# Patient Record
Sex: FEMALE | Race: WHITE | Employment: FULL TIME | URBAN - METROPOLITAN AREA
[De-identification: names, ages, dates, MRNs, and addresses within clinical notes are randomized per-mention and may not be internally consistent; named-entity substitution may affect disease eponyms.]

---

## 2020-02-06 ENCOUNTER — TELEPHONE (OUTPATIENT)
Dept: OTHER | Facility: OTHER | Age: 35
End: 2020-02-06

## 2020-02-06 NOTE — TELEPHONE ENCOUNTER
02/06/2020  4:10 PM    Called and LM for patient who was No Show for 4:00 pm Virtual Visit.    Paulette Minaya, MPH, PA-C  [facility]  [phone]

## 2020-02-20 ENCOUNTER — CLINICAL SUPPORT (OUTPATIENT)
Dept: OTHER | Facility: OTHER | Age: 35
End: 2020-02-20

## 2020-02-20 VITALS
BODY MASS INDEX: 32.95 KG/M2 | WEIGHT: 205 LBS | DIASTOLIC BLOOD PRESSURE: 80 MMHG | HEIGHT: 66 IN | SYSTOLIC BLOOD PRESSURE: 128 MMHG

## 2020-02-20 DIAGNOSIS — I10 HYPERTENSION, ESSENTIAL: Chronic | ICD-10-CM

## 2020-02-20 RX ORDER — FLUCONAZOLE 150 MG/1
1 TABLET ORAL DAILY PRN
COMMUNITY
Start: 2020-01-28 | End: 2022-04-11 | Stop reason: SDUPTHER

## 2020-02-20 RX ORDER — BUPROPION HYDROCHLORIDE 150 MG/1
300 TABLET ORAL DAILY
COMMUNITY
Start: 2020-01-12 | End: 2022-04-11 | Stop reason: SDUPTHER

## 2020-02-20 RX ORDER — LISINOPRIL AND HYDROCHLOROTHIAZIDE 10; 12.5 MG/1; MG/1
1 TABLET ORAL DAILY
COMMUNITY
Start: 2020-01-21 | End: 2022-03-03

## 2020-02-20 RX ORDER — INSULIN ASPART 100 [IU]/ML
INJECTION, SOLUTION INTRAVENOUS; SUBCUTANEOUS
COMMUNITY
Start: 2019-12-23 | End: 2022-03-30 | Stop reason: SDUPTHER

## 2020-02-20 RX ORDER — METFORMIN HYDROCHLORIDE 500 MG/1
1000 TABLET ORAL 2 TIMES DAILY
COMMUNITY
Start: 2020-01-29

## 2020-02-20 NOTE — PROGRESS NOTES
Digital Medicine: Video Consult    33 yo female presents for enrollment in [employer].  Pt is interested in the Diabetes Program too but has OLEGARIO and is on an insulin pump so does no qualify for this Program.  She was diagnosed with HTN 12 years ago and states that her readings fluctuate depending on her sodium intake and her weight.  States that her weight is steadily increasing.  Currently exercising only once weekly for 30-60 minutes but hopes to start doing more soon.  States affording her insulin is sometimes difficult.  No current PCP though she has an Endocrinologist that she sees regularly.     Hypertension Consult   This is a chronic problem. The current episode started more than 1 year ago. The problem has been waxing and waning since onset. The problem is uncontrolled. Pertinent negatives include no blurred vision, chest pain or headaches. There are no associated agents to hypertension. Risk factors for coronary artery disease include dyslipidemia, obesity and diabetes mellitus. Past treatments include ACE inhibitors and diuretics. The current treatment provides moderate improvement. There are no compliance problems.  There is no history of kidney disease or CAD/MI.       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), managed as type 1    Overweight    Pure hypercholesterolemia    Essential hypertension       Past Medical History:   Diagnosis Date    Depression     Diabetes mellitus type I     OLEGARIO    Diabetes mellitus, type 2     OLEGARIO    Hyperlipidemia     Hypertension     Obesity        Family History   Problem Relation Age of Onset    Anemia Mother     Diabetes Mother     Diabetes Father        Social History     Socioeconomic History    Marital status: Single     Spouse name: Not on file    Number of children: Not on file    Years of education: Not on file     Highest education level: Not on file   Occupational History    Not on file   Social Needs    Financial resource strain: Somewhat hard    Food insecurity:     Worry: Not on file     Inability: Not on file    Transportation needs:     Medical: Not on file     Non-medical: Not on file   Tobacco Use    Smoking status: Former Smoker     Types: Cigarettes    Smokeless tobacco: Never Used   Substance and Sexual Activity    Alcohol use: Yes     Frequency: 2-3 times a week     Binge frequency: Less than monthly    Drug use: Never    Sexual activity: Not on file   Lifestyle    Physical activity:     Days per week: 1 day     Minutes per session: 40 min    Stress: Not on file   Relationships    Social connections:     Talks on phone: Not on file     Gets together: Not on file     Attends [employer] service: Not on file     Active member of club or organization: Not on file     Attends meetings of clubs or organizations: Not on file     Relationship status: Not on file   Other Topics Concern    Not on file   Social History Narrative    Not on file       Review of patient's allergies indicates:   Allergen Reactions    Penicillins          Current Outpatient Medications:     buPROPion (WELLBUTRIN XL) 150 MG TB24 tablet, Take 300 mg by mouth once daily., Disp: , Rfl:     fluconazole (DIFLUCAN) 150 MG Tab, Take 1 tablet by mouth daily as needed., Disp: , Rfl:     lisinopril-hydrochlorothiazide (PRINZIDE,ZESTORETIC) 10-12.5 mg per tablet, Take 1 tablet by mouth once daily., Disp: , Rfl:     metFORMIN (GLUCOPHAGE) 500 MG tablet, Take 1,000 mg by mouth 2 (two) times daily., Disp: , Rfl:     NOVOLOG U-100 INSULIN ASPART 100 unit/mL injection, 80 UNITS ONCE DAILY TOTAL DAILY DOSE PER INSULIN PUMP, Disp: , Rfl:         Patient is eligible for enrollment.  Enrollment completed.            Assessment & Plan      Essential Hypertension I10  Patient to be enrolled in the Hypertension Digital Medicine Program.   - An [facility]  digital blood pressure cuff will be provided for at-home use.  Patient will be trained to use the device and start recording the blood pressure readings.   - With each blood pressure measurement, the patient's data automatically integrates with [facility] electronic medical record system.    - The patient's Care Team monitors the results and intervenes with medication or lifestyle modifications as needed.   - The patient and his/her physician (if applicable) receive customized monthly reports to track progress.

## 2020-03-10 ENCOUNTER — PATIENT MESSAGE (OUTPATIENT)
Dept: ADMINISTRATIVE | Facility: OTHER | Age: 35
End: 2020-03-10

## 2020-05-21 ENCOUNTER — PATIENT MESSAGE (OUTPATIENT)
Dept: ADMINISTRATIVE | Facility: OTHER | Age: 35
End: 2020-05-21

## 2022-03-03 ENCOUNTER — OFFICE VISIT (OUTPATIENT)
Dept: FAMILY MEDICINE | Facility: CLINIC | Age: 37
End: 2022-03-03
Payer: COMMERCIAL

## 2022-03-03 DIAGNOSIS — I10 HYPERTENSION, ESSENTIAL: Primary | ICD-10-CM

## 2022-03-03 PROCEDURE — 99214 OFFICE O/P EST MOD 30 MIN: CPT | Mod: 95,,, | Performed by: PHYSICIAN ASSISTANT

## 2022-03-03 PROCEDURE — 99214 PR OFFICE/OUTPT VISIT, EST, LEVL IV, 30-39 MIN: ICD-10-PCS | Mod: 95,,, | Performed by: PHYSICIAN ASSISTANT

## 2022-03-03 RX ORDER — AMLODIPINE BESYLATE 5 MG/1
5 TABLET ORAL DAILY
Qty: 30 TABLET | Refills: 0 | Status: SHIPPED | OUTPATIENT
Start: 2022-03-03 | End: 2022-04-01

## 2022-03-03 RX ORDER — LISINOPRIL AND HYDROCHLOROTHIAZIDE 20; 25 MG/1; MG/1
1 TABLET ORAL DAILY
COMMUNITY

## 2022-03-03 RX ORDER — METOPROLOL SUCCINATE 25 MG/1
25 TABLET, EXTENDED RELEASE ORAL DAILY
COMMUNITY

## 2022-03-03 NOTE — PATIENT INSTRUCTIONS
A few reminders from today:    Continue medications.   Start amlodipine. Take in the evening.   Continue to monitor BP  F/U 2 weeks    Follow up with me if needed.   Please go to ER/urgent care if after hours or symptoms persist/worsen.     Do not hesitate to get in touch with me should you have any further questions.     Thank you for trusting me with your care!  I wish you health and happiness.    -Dara Enriquez PA-C

## 2022-03-03 NOTE — PROGRESS NOTES
Subjective:       Patient ID: Susan Delaney is a 36 y.o. female.    The patient location is: [place], [place]  The chief complaint leading to consultation is: HTN    Visit type: audiovisual    Face to Face time with patient: 20  minutes of total time spent on the encounter, which includes face to face time and non-face to face time preparing to see the patient (eg, review of tests), Obtaining and/or reviewing separately obtained history, Documenting clinical information in the electronic or other health record, Independently interpreting results (not separately reported) and communicating results to the patient/family/caregiver, or Care coordination (not separately reported).     Each patient to whom he or she provides medical services by telemedicine is:  (1) informed of the relationship between the physician and patient and the respective role of any other health care provider with respect to management of the patient; and (2) notified that he or she may decline to receive medical services by telemedicine and may withdraw from such care at any time.      Chief Complaint: HTN    HPI     Pt is new to me and this clinic.     Pt is a 36 year old female with depression, HTN, HLD, T1DM. She presents today complaining of elevated blood pressure. Pt states she is compliant with all of her meds and follows a low sodium diet. Most recent readings: 142/101, 152/107. She does admit to headache with dull pressure. Denies vision changes, confusion, focal weakness.     Review of Systems   Constitutional: Negative for activity change, appetite change, chills, fatigue, fever and unexpected weight change.   HENT: Negative for nasal congestion, ear pain, hearing loss, rhinorrhea and sore throat.    Eyes: Negative for visual disturbance.   Respiratory: Negative for cough, chest tightness, shortness of breath and wheezing.    Cardiovascular: Negative for chest pain and palpitations.   Gastrointestinal: Negative for abdominal pain,  change in bowel habit, constipation, diarrhea, nausea, vomiting, reflux and change in bowel habit.   Genitourinary: Negative for difficulty urinating, dysuria, frequency, hematuria, menstrual irregularity, menstrual problem and urgency.   Musculoskeletal: Positive for neck pain. Negative for arthralgias and myalgias.   Integumentary:  Negative for rash.   Neurological: Positive for headaches. Negative for dizziness, vertigo, seizures, syncope, weakness and light-headedness.   Psychiatric/Behavioral: Negative for dysphoric mood and sleep disturbance. The patient is not nervous/anxious.          Objective:      Physical Exam  Vitals and nursing note reviewed.   Constitutional:       General: She is not in acute distress.     Appearance: Normal appearance. She is not ill-appearing, toxic-appearing or diaphoretic.   HENT:      Head: Normocephalic and atraumatic.      Right Ear: Tympanic membrane, ear canal and external ear normal. There is no impacted cerumen.      Left Ear: Tympanic membrane, ear canal and external ear normal. There is no impacted cerumen.   Eyes:      General: No scleral icterus.        Right eye: No discharge.         Left eye: No discharge.      Extraocular Movements: Extraocular movements intact.      Conjunctiva/sclera: Conjunctivae normal.      Pupils: Pupils are equal, round, and reactive to light.   Cardiovascular:      Rate and Rhythm: Normal rate and regular rhythm.      Pulses: Normal pulses.      Heart sounds: Normal heart sounds. No murmur heard.    No friction rub. No gallop.   Pulmonary:      Effort: Pulmonary effort is normal. No respiratory distress.      Breath sounds: Normal breath sounds. No stridor. No wheezing, rhonchi or rales.   Abdominal:      General: Abdomen is flat. Bowel sounds are normal. There is no distension.      Palpations: Abdomen is soft. There is no mass.      Tenderness: There is no abdominal tenderness. There is no right CVA tenderness, left CVA tenderness,  guarding or rebound.   Musculoskeletal:         General: No deformity or signs of injury.      Cervical back: Normal range of motion and neck supple.      Right lower leg: No edema.      Left lower leg: No edema.   Lymphadenopathy:      Cervical: No cervical adenopathy.   Skin:     General: Skin is warm.      Capillary Refill: Capillary refill takes less than 2 seconds.      Coloration: Skin is not jaundiced or pale.      Findings: No lesion or rash.   Neurological:      General: No focal deficit present.      Mental Status: She is alert and oriented to person, place, and time. Mental status is at baseline.   Psychiatric:         Mood and Affect: Mood normal.         Behavior: Behavior normal.         Thought Content: Thought content normal.         Judgment: Judgment normal.       Full exam was not able to be performed today, as this was a virtual visit.     Assessment:       Problem List Items Addressed This Visit        Cardiac/Vascular    Hypertension, essential - Primary (Chronic)    Relevant Medications    amLODIPine (NORVASC) 5 MG tablet          Plan:           1. Hypertension, essential  - amLODIPine (NORVASC) 5 MG tablet; Take 1 tablet (5 mg total) by mouth once daily.  Dispense: 30 tablet; Refill: 0  -medication resistant HTN--recommend full workup for secondary causes    RTC/ER precautions given. F/U 2 weeks

## 2022-03-09 ENCOUNTER — TELEPHONE (OUTPATIENT)
Dept: FAMILY MEDICINE | Facility: CLINIC | Age: 37
End: 2022-03-09
Payer: COMMERCIAL

## 2022-03-09 NOTE — TELEPHONE ENCOUNTER
----- Message from Dara Enriquez PA-C sent at 3/3/2022  1:11 PM CST -----  Call in 1 week about BP

## 2022-03-23 ENCOUNTER — OFFICE VISIT (OUTPATIENT)
Dept: OBSTETRICS AND GYNECOLOGY | Facility: CLINIC | Age: 37
End: 2022-03-23
Payer: COMMERCIAL

## 2022-03-23 ENCOUNTER — LAB VISIT (OUTPATIENT)
Dept: LAB | Facility: HOSPITAL | Age: 37
End: 2022-03-23
Attending: STUDENT IN AN ORGANIZED HEALTH CARE EDUCATION/TRAINING PROGRAM
Payer: COMMERCIAL

## 2022-03-23 VITALS
DIASTOLIC BLOOD PRESSURE: 80 MMHG | RESPIRATION RATE: 16 BRPM | BODY MASS INDEX: 32.95 KG/M2 | SYSTOLIC BLOOD PRESSURE: 136 MMHG | HEIGHT: 66 IN | WEIGHT: 205 LBS

## 2022-03-23 DIAGNOSIS — Z01.419 WELL WOMAN EXAM: Primary | ICD-10-CM

## 2022-03-23 DIAGNOSIS — Z87.42 HX OF ABNORMAL CERVICAL PAP SMEAR: ICD-10-CM

## 2022-03-23 DIAGNOSIS — N93.9 ABNORMAL UTERINE BLEEDING (AUB): ICD-10-CM

## 2022-03-23 DIAGNOSIS — I10 ESSENTIAL HYPERTENSION: ICD-10-CM

## 2022-03-23 DIAGNOSIS — E10.69 TYPE 1 DIABETES MELLITUS WITH OTHER SPECIFIED COMPLICATION: ICD-10-CM

## 2022-03-23 LAB
ESTIMATED AVG GLUCOSE: 186 MG/DL (ref 68–131)
HBA1C MFR BLD: 8.1 % (ref 4–5.6)
TSH SERPL DL<=0.005 MIU/L-ACNC: 1.41 UIU/ML (ref 0.4–4)

## 2022-03-23 PROCEDURE — 36415 COLL VENOUS BLD VENIPUNCTURE: CPT | Mod: PN | Performed by: STUDENT IN AN ORGANIZED HEALTH CARE EDUCATION/TRAINING PROGRAM

## 2022-03-23 PROCEDURE — 1159F MED LIST DOCD IN RCRD: CPT | Mod: CPTII,S$GLB,, | Performed by: STUDENT IN AN ORGANIZED HEALTH CARE EDUCATION/TRAINING PROGRAM

## 2022-03-23 PROCEDURE — 88141 PR  CYTOPATH CERV/VAG INTERPRET: ICD-10-PCS | Mod: ,,, | Performed by: STUDENT IN AN ORGANIZED HEALTH CARE EDUCATION/TRAINING PROGRAM

## 2022-03-23 PROCEDURE — 99999 PR PBB SHADOW E&M-EST. PATIENT-LVL IV: ICD-10-PCS | Mod: PBBFAC,,, | Performed by: STUDENT IN AN ORGANIZED HEALTH CARE EDUCATION/TRAINING PROGRAM

## 2022-03-23 PROCEDURE — 3008F PR BODY MASS INDEX (BMI) DOCUMENTED: ICD-10-PCS | Mod: CPTII,S$GLB,, | Performed by: STUDENT IN AN ORGANIZED HEALTH CARE EDUCATION/TRAINING PROGRAM

## 2022-03-23 PROCEDURE — 83036 HEMOGLOBIN GLYCOSYLATED A1C: CPT | Performed by: STUDENT IN AN ORGANIZED HEALTH CARE EDUCATION/TRAINING PROGRAM

## 2022-03-23 PROCEDURE — 99999 PR PBB SHADOW E&M-EST. PATIENT-LVL IV: CPT | Mod: PBBFAC,,, | Performed by: STUDENT IN AN ORGANIZED HEALTH CARE EDUCATION/TRAINING PROGRAM

## 2022-03-23 PROCEDURE — 88175 CYTOPATH C/V AUTO FLUID REDO: CPT | Performed by: STUDENT IN AN ORGANIZED HEALTH CARE EDUCATION/TRAINING PROGRAM

## 2022-03-23 PROCEDURE — 3079F PR MOST RECENT DIASTOLIC BLOOD PRESSURE 80-89 MM HG: ICD-10-PCS | Mod: CPTII,S$GLB,, | Performed by: STUDENT IN AN ORGANIZED HEALTH CARE EDUCATION/TRAINING PROGRAM

## 2022-03-23 PROCEDURE — 3075F PR MOST RECENT SYSTOLIC BLOOD PRESS GE 130-139MM HG: ICD-10-PCS | Mod: CPTII,S$GLB,, | Performed by: STUDENT IN AN ORGANIZED HEALTH CARE EDUCATION/TRAINING PROGRAM

## 2022-03-23 PROCEDURE — 84443 ASSAY THYROID STIM HORMONE: CPT | Performed by: STUDENT IN AN ORGANIZED HEALTH CARE EDUCATION/TRAINING PROGRAM

## 2022-03-23 PROCEDURE — 4010F ACE/ARB THERAPY RXD/TAKEN: CPT | Mod: CPTII,S$GLB,, | Performed by: STUDENT IN AN ORGANIZED HEALTH CARE EDUCATION/TRAINING PROGRAM

## 2022-03-23 PROCEDURE — 87591 N.GONORRHOEAE DNA AMP PROB: CPT | Performed by: STUDENT IN AN ORGANIZED HEALTH CARE EDUCATION/TRAINING PROGRAM

## 2022-03-23 PROCEDURE — 3075F SYST BP GE 130 - 139MM HG: CPT | Mod: CPTII,S$GLB,, | Performed by: STUDENT IN AN ORGANIZED HEALTH CARE EDUCATION/TRAINING PROGRAM

## 2022-03-23 PROCEDURE — 99385 PREV VISIT NEW AGE 18-39: CPT | Mod: 25,S$GLB,, | Performed by: STUDENT IN AN ORGANIZED HEALTH CARE EDUCATION/TRAINING PROGRAM

## 2022-03-23 PROCEDURE — 4010F PR ACE/ARB THEARPY RXD/TAKEN: ICD-10-PCS | Mod: CPTII,S$GLB,, | Performed by: STUDENT IN AN ORGANIZED HEALTH CARE EDUCATION/TRAINING PROGRAM

## 2022-03-23 PROCEDURE — 88141 CYTOPATH C/V INTERPRET: CPT | Mod: ,,, | Performed by: STUDENT IN AN ORGANIZED HEALTH CARE EDUCATION/TRAINING PROGRAM

## 2022-03-23 PROCEDURE — 3079F DIAST BP 80-89 MM HG: CPT | Mod: CPTII,S$GLB,, | Performed by: STUDENT IN AN ORGANIZED HEALTH CARE EDUCATION/TRAINING PROGRAM

## 2022-03-23 PROCEDURE — 87624 HPV HI-RISK TYP POOLED RSLT: CPT | Performed by: STUDENT IN AN ORGANIZED HEALTH CARE EDUCATION/TRAINING PROGRAM

## 2022-03-23 PROCEDURE — 3008F BODY MASS INDEX DOCD: CPT | Mod: CPTII,S$GLB,, | Performed by: STUDENT IN AN ORGANIZED HEALTH CARE EDUCATION/TRAINING PROGRAM

## 2022-03-23 PROCEDURE — 87491 CHLMYD TRACH DNA AMP PROBE: CPT | Performed by: STUDENT IN AN ORGANIZED HEALTH CARE EDUCATION/TRAINING PROGRAM

## 2022-03-23 PROCEDURE — 99385 PR PREVENTIVE VISIT,NEW,18-39: ICD-10-PCS | Mod: 25,S$GLB,, | Performed by: STUDENT IN AN ORGANIZED HEALTH CARE EDUCATION/TRAINING PROGRAM

## 2022-03-23 PROCEDURE — 1159F PR MEDICATION LIST DOCUMENTED IN MEDICAL RECORD: ICD-10-PCS | Mod: CPTII,S$GLB,, | Performed by: STUDENT IN AN ORGANIZED HEALTH CARE EDUCATION/TRAINING PROGRAM

## 2022-03-23 NOTE — PROGRESS NOTES
History & Physical  Gynecology      SUBJECTIVE:     Chief Complaint: Establish Care and Well Woman       History of Present Illness:    36 y.o. here today for well woman exam.     Gyn: regular periods, now getting heavier and longer. Sexually active, no penetrative sex with men. Hx of abnormal pap and LEEP. No immediate FH of gyn or colon cancer. No other hx of STI    Works traveling assignments, currently at [employer]     Review of patient's allergies indicates:   Allergen Reactions    Penicillins        Past Medical History:   Diagnosis Date    Abnormal Pap smear of cervix     Depression     Diabetes mellitus type I     OLEGARIO    Diabetes mellitus, type 2     OLEGARIO    Hyperlipidemia     Hypertension     Obesity      Past Surgical History:   Procedure Laterality Date    CERVICAL BIOPSY  W/ LOOP ELECTRODE EXCISION       OB History        0    Para   0    Term   0       0    AB   0    Living   0       SAB   0    IAB   0    Ectopic   0    Multiple   0    Live Births   0               Family History   Problem Relation Age of Onset    Anemia Mother     Diabetes Mother     Diabetes Father     Breast cancer Paternal Grandmother     Colon cancer Maternal Grandfather     Ovarian cancer Neg Hx      Social History     Tobacco Use    Smoking status: Former Smoker     Types: Cigarettes    Smokeless tobacco: Never Used   Substance Use Topics    Alcohol use: Yes    Drug use: Never       Current Outpatient Medications   Medication Sig    amLODIPine (NORVASC) 5 MG tablet Take 1 tablet (5 mg total) by mouth once daily.    buPROPion (WELLBUTRIN XL) 150 MG TB24 tablet Take 300 mg by mouth once daily.    fluconazole (DIFLUCAN) 150 MG Tab Take 1 tablet by mouth daily as needed.    lisinopriL (PRINIVIL,ZESTRIL) 20 MG tablet Take 1 tablet (20 mg total) by mouth once daily.    lisinopriL-hydrochlorothiazide (PRINZIDE,ZESTORETIC) 20-25 mg Tab Take 1 tablet by mouth once daily.    metFORMIN (GLUCOPHAGE) 500 MG  tablet Take 1,000 mg by mouth 2 (two) times daily.    metoprolol succinate (TOPROL-XL) 25 MG 24 hr tablet Take 25 mg by mouth once daily.    NOVOLOG U-100 INSULIN ASPART 100 unit/mL injection 80 UNITS ONCE DAILY TOTAL DAILY DOSE PER INSULIN PUMP     No current facility-administered medications for this visit.         Review of Systems:  Review of Systems   Constitutional: Negative for chills, fatigue and fever.   HENT: Negative for congestion.    Eyes: Negative for visual disturbance.   Respiratory: Negative for cough and shortness of breath.    Cardiovascular: Negative for chest pain and palpitations.   Gastrointestinal: Negative for abdominal distention, abdominal pain, constipation, diarrhea, nausea and vomiting.   Genitourinary: Positive for menstrual problem. Negative for difficulty urinating, dysuria, hematuria, vaginal bleeding and vaginal discharge.   Skin: Negative for rash.   Neurological: Negative for dizziness, seizures, light-headedness and headaches.   Hematological: Does not bruise/bleed easily.   Psychiatric/Behavioral: Negative for dysphoric mood. The patient is not nervous/anxious.         OBJECTIVE:     Physical Exam:  Physical Exam  Vitals reviewed.   Constitutional:       General: She is not in acute distress.     Appearance: She is well-developed.   HENT:      Head: Normocephalic and atraumatic.   Cardiovascular:      Rate and Rhythm: Normal rate and regular rhythm.   Pulmonary:      Effort: Pulmonary effort is normal.   Chest:   Breasts:      Right: No inverted nipple, mass, nipple discharge, skin change or tenderness.      Left: No inverted nipple, mass, nipple discharge, skin change or tenderness.       Abdominal:      General: There is no distension.      Palpations: Abdomen is soft.   Genitourinary:     Vagina: Normal.      Comments: Normal external female genitalia, normal hair distribution. Vaginal mucosa pink, moist, well rugated, scant white physiologic discharge. No blood in vault.  Cervix pink, non-friable, without lesion. No CMT. Uterus non tender, mobile, not enlarged. Adnexa without fullness or tenderness.    Skin:     General: Skin is warm.   Neurological:      Mental Status: She is alert and oriented to person, place, and time.   Psychiatric:         Behavior: Behavior normal.         Thought Content: Thought content normal.         Judgment: Judgment normal.           ASSESSMENT:       ICD-10-CM ICD-9-CM    1. Well woman exam  Z01.419 V72.31 Liquid-Based Pap Smear, Screening      HPV High Risk Genotypes, PCR   2. Abnormal uterine bleeding (AUB)  N93.9 626.9 TSH      Hemoglobin A1C      US Pelvis Comp with Transvag NON-OB (xpd      C. trachomatis/N. gonorrhoeae by AMP DNA   3. Hx of abnormal cervical Pap smear  Z87.42 V13.29    4. Type 1 diabetes mellitus with other specified complication  E10.69 250.81    5. Essential hypertension  I10 401.9        Orders Placed This Encounter   Procedures    HPV High Risk Genotypes, PCR     Release to patient->Immediate     Order Specific Question:   Source     Answer:   Cervix     Order Specific Question:   Release to patient     Answer:   Immediate    C. trachomatis/N. gonorrhoeae by AMP DNA     Order Specific Question:   Source:     Answer:   Cervicovaginal    US Pelvis Comp with Transvag NON-OB (xpd     Standing Status:   Future     Standing Expiration Date:   3/23/2023     Order Specific Question:   May the Radiologist modify the order per protocol to meet the clinical needs of the patient?     Answer:   Yes     Order Specific Question:   Release to patient     Answer:   Immediate    TSH     Standing Status:   Future     Standing Expiration Date:   3/23/2023    Hemoglobin A1C     Standing Status:   Future     Standing Expiration Date:   5/22/2023           Plan:      Well woman:  - Pap today   - MMG @ 40  - colonoscopy @ 43  - tobacco cessation n/a  - contraception n/a  - HPV vaccine pending pap   - Safe Sex discussed   - Obesity: Patient was  counseled today regarding her diagnosis of obesity. We discussed her BMI and that normal BMI should be less than 25. We discussed the role obesity plays in her overall health, including increased risk for or worsening of diabetes, hypertension and cardiovascular disease. I recommend starting with a goal of losing 5% of her body weight. We discussed strategies for weight loss including appropriate portion sizes of healthier food choices such as grilled or baked protein sources, increasing fruits and vegetables while decreasing carbohydrate intake. Also discussed increasing physical activity in daily routine, such as taking stairs instead of elevators, parking at the back of the parking lot and walking into stores, and exercise when able.  Informational handouts on diet, exercise and obesity were given to the patient.   - DEXA @65  - Counseled to take daily multivitamin. If patient is of reproductive age and not on contraception, to take prenatal vitamin. Patient has been counseled on the vitamin D and calcium requirements per ACOG recommendations.  Age   Calcium(mg/day)   Vitamin D (IU/day)  9-18    1300                       600  19-50  1000                       600  51-70  1200                       600  >70      1,200                      800  Patient to continue vitamins    CC: AUB  HPI: Periods usually regular but now regular but longer and heavier bleeding for past few months. Also having some mood symptoms with her periods.   A/P:  - Patient was counseled today on the causes of AUB including structural and non-structural: fibroids, polyps, adenomyosis and anovulation resulting in endometrial hyperplasia, endometritis, cervicitis; the probable need for a proper evaluation and for a tissue diagnosis was discussed.  A Hysteroscopy D/C  may be necessary. The need for a bHCG, CBC, TSH, cultures, and TVUS was discussed. Further workup may be indicated if suspect patient has a hemophilia. The hormonal treatment  options for menorrhagia include: various progesterones,  OCPs + NSAID, IUD-Mirena, TXA, and elagolix. The pros, cons, risks, and benefits of each was discussed. We discussed that if based off of test results or that if medical management fails, other options may include surgery. These include myomectomy, uterine artery embolization,endometrial ablation and Hysterectomy, each was discussed in detail.   During the consultation session, patient was given the opportunity to ask questions and all of her questions were answered.  - labs and TVUS ordered  - discussed pending labs may need Embx given high risk for EIN because of DM and HTN  - offered meds today, declines  - will get results back and then bring back to discuss options. Had been previously counseled on an ablation     Priti Jo M.D.  Obstetrics and Gynecology

## 2022-03-24 LAB
C TRACH DNA SPEC QL NAA+PROBE: NOT DETECTED
N GONORRHOEA DNA SPEC QL NAA+PROBE: NOT DETECTED

## 2022-03-28 DIAGNOSIS — I10 HYPERTENSION, ESSENTIAL: ICD-10-CM

## 2022-03-29 ENCOUNTER — LAB VISIT (OUTPATIENT)
Dept: LAB | Facility: HOSPITAL | Age: 37
End: 2022-03-29
Attending: INTERNAL MEDICINE
Payer: COMMERCIAL

## 2022-03-29 ENCOUNTER — OFFICE VISIT (OUTPATIENT)
Dept: ENDOCRINOLOGY | Facility: CLINIC | Age: 37
End: 2022-03-29
Payer: COMMERCIAL

## 2022-03-29 VITALS
WEIGHT: 203.06 LBS | HEART RATE: 92 BPM | SYSTOLIC BLOOD PRESSURE: 126 MMHG | BODY MASS INDEX: 32.64 KG/M2 | DIASTOLIC BLOOD PRESSURE: 72 MMHG | OXYGEN SATURATION: 99 % | HEIGHT: 66 IN

## 2022-03-29 DIAGNOSIS — E13.9 LADA (LATENT AUTOIMMUNE DIABETES IN ADULTS), MANAGED AS TYPE 1: Primary | ICD-10-CM

## 2022-03-29 DIAGNOSIS — I10 HYPERTENSION, ESSENTIAL: Chronic | ICD-10-CM

## 2022-03-29 DIAGNOSIS — E10.65 TYPE 1 DIABETES MELLITUS WITH HYPERGLYCEMIA: ICD-10-CM

## 2022-03-29 DIAGNOSIS — E66.09 CLASS 1 OBESITY DUE TO EXCESS CALORIES WITH SERIOUS COMORBIDITY AND BODY MASS INDEX (BMI) OF 32.0 TO 32.9 IN ADULT: ICD-10-CM

## 2022-03-29 LAB
HPV HR 12 DNA SPEC QL NAA+PROBE: NEGATIVE
HPV16 AG SPEC QL: NEGATIVE
HPV18 DNA SPEC QL NAA+PROBE: NEGATIVE

## 2022-03-29 PROCEDURE — 1159F MED LIST DOCD IN RCRD: CPT | Mod: CPTII,S$GLB,, | Performed by: INTERNAL MEDICINE

## 2022-03-29 PROCEDURE — 1160F RVW MEDS BY RX/DR IN RCRD: CPT | Mod: CPTII,S$GLB,, | Performed by: INTERNAL MEDICINE

## 2022-03-29 PROCEDURE — 82043 UR ALBUMIN QUANTITATIVE: CPT | Performed by: INTERNAL MEDICINE

## 2022-03-29 PROCEDURE — 82570 ASSAY OF URINE CREATININE: CPT | Performed by: INTERNAL MEDICINE

## 2022-03-29 PROCEDURE — 4010F PR ACE/ARB THEARPY RXD/TAKEN: ICD-10-PCS | Mod: CPTII,S$GLB,, | Performed by: INTERNAL MEDICINE

## 2022-03-29 PROCEDURE — 1159F PR MEDICATION LIST DOCUMENTED IN MEDICAL RECORD: ICD-10-PCS | Mod: CPTII,S$GLB,, | Performed by: INTERNAL MEDICINE

## 2022-03-29 PROCEDURE — 3052F HG A1C>EQUAL 8.0%<EQUAL 9.0%: CPT | Mod: CPTII,S$GLB,, | Performed by: INTERNAL MEDICINE

## 2022-03-29 PROCEDURE — 95251 PR GLUCOSE MONITOR, 72 HOUR, PHYS INTERP: ICD-10-PCS | Mod: S$GLB,,, | Performed by: INTERNAL MEDICINE

## 2022-03-29 PROCEDURE — 3008F PR BODY MASS INDEX (BMI) DOCUMENTED: ICD-10-PCS | Mod: CPTII,S$GLB,, | Performed by: INTERNAL MEDICINE

## 2022-03-29 PROCEDURE — 3074F PR MOST RECENT SYSTOLIC BLOOD PRESSURE < 130 MM HG: ICD-10-PCS | Mod: CPTII,S$GLB,, | Performed by: INTERNAL MEDICINE

## 2022-03-29 PROCEDURE — 3074F SYST BP LT 130 MM HG: CPT | Mod: CPTII,S$GLB,, | Performed by: INTERNAL MEDICINE

## 2022-03-29 PROCEDURE — 3078F DIAST BP <80 MM HG: CPT | Mod: CPTII,S$GLB,, | Performed by: INTERNAL MEDICINE

## 2022-03-29 PROCEDURE — 1160F PR REVIEW ALL MEDS BY PRESCRIBER/CLIN PHARMACIST DOCUMENTED: ICD-10-PCS | Mod: CPTII,S$GLB,, | Performed by: INTERNAL MEDICINE

## 2022-03-29 PROCEDURE — 4010F ACE/ARB THERAPY RXD/TAKEN: CPT | Mod: CPTII,S$GLB,, | Performed by: INTERNAL MEDICINE

## 2022-03-29 PROCEDURE — 3008F BODY MASS INDEX DOCD: CPT | Mod: CPTII,S$GLB,, | Performed by: INTERNAL MEDICINE

## 2022-03-29 PROCEDURE — 3052F PR MOST RECENT HEMOGLOBIN A1C LEVEL 8.0 - < 9.0%: ICD-10-PCS | Mod: CPTII,S$GLB,, | Performed by: INTERNAL MEDICINE

## 2022-03-29 PROCEDURE — 3061F NEG MICROALBUMINURIA REV: CPT | Mod: CPTII,S$GLB,, | Performed by: INTERNAL MEDICINE

## 2022-03-29 PROCEDURE — 3066F NEPHROPATHY DOC TX: CPT | Mod: CPTII,S$GLB,, | Performed by: INTERNAL MEDICINE

## 2022-03-29 PROCEDURE — 99999 PR PBB SHADOW E&M-EST. PATIENT-LVL IV: CPT | Mod: PBBFAC,,, | Performed by: INTERNAL MEDICINE

## 2022-03-29 PROCEDURE — 3078F PR MOST RECENT DIASTOLIC BLOOD PRESSURE < 80 MM HG: ICD-10-PCS | Mod: CPTII,S$GLB,, | Performed by: INTERNAL MEDICINE

## 2022-03-29 PROCEDURE — 99204 OFFICE O/P NEW MOD 45 MIN: CPT | Mod: S$GLB,,, | Performed by: INTERNAL MEDICINE

## 2022-03-29 PROCEDURE — 99204 PR OFFICE/OUTPT VISIT, NEW, LEVL IV, 45-59 MIN: ICD-10-PCS | Mod: S$GLB,,, | Performed by: INTERNAL MEDICINE

## 2022-03-29 PROCEDURE — 99999 PR PBB SHADOW E&M-EST. PATIENT-LVL IV: ICD-10-PCS | Mod: PBBFAC,,, | Performed by: INTERNAL MEDICINE

## 2022-03-29 PROCEDURE — 3066F PR DOCUMENTATION OF TREATMENT FOR NEPHROPATHY: ICD-10-PCS | Mod: CPTII,S$GLB,, | Performed by: INTERNAL MEDICINE

## 2022-03-29 PROCEDURE — 95251 CONT GLUC MNTR ANALYSIS I&R: CPT | Mod: S$GLB,,, | Performed by: INTERNAL MEDICINE

## 2022-03-29 PROCEDURE — 3061F PR NEG MICROALBUMINURIA RESULT DOCUMENTED/REVIEW: ICD-10-PCS | Mod: CPTII,S$GLB,, | Performed by: INTERNAL MEDICINE

## 2022-03-29 NOTE — PROGRESS NOTES
Subjective:    Patient ID:  Susan Delaney is a 36 y.o. female.    Chief Complaint:  Diabetes      Pt presents to establish care for OLEGARIO/Type 1 DM (HANSA Ab/insulin Ab positive, low cpeptide) and review of chronic medical conditions as listed in the visit diagnosis section of this encounter.     Reviewed a prior endo note, Dr. Nguyen. Since then is seeing a private endo in her home of [place], [place].     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    Had orderTalk 770 with guardian sensor.  Notes her sensor has been going out and has not been wearing it for the last week. Needed an appt with an endo for refills.      Onset of OLEGARIO at age 17.    Known diabetic complications: retinopathy  History of DKA: no    Current pump settings:    ICR 1:10    ISF 30    Target     Basal   MN 1.25  03:00 1.3  07:00 1.5  21:00 1.4      Interpretation of data from guardian sensor performed on 3/30/2022  Data from dates 3/16 to 3/29 reviewed    Average glucose: 204 mg/dL  Above 180 mg/dL: 31  % (Above 250 mg/dL: 1%)  At goal  mg/dL: 68%  Below 70 mg/dL: 0%  (Below 54 mg/dL: 0)%    CGM data reviewed and notable for the following trends: BG while wearing sensor more controlled but still with post-prandial excusions. Basal therapy often adjusted throughout the day.    Auto mode 41%  Manual mode 58%    TDD 49.7  Bolus 29%  Auto basal/basal 71%    AIT: 3 hrs     Reviewed prior endo note. In the past took::  Amaryl (from 2009 - 2011) - ineffective; sugars were the same off/on  Actos (from 2010 - 2011) - ineffective and weight gain  Metformin 1000 mg BID (since diagnosis)  Lantus   Humalog    Home blood sugar records since not using sensor:  Fasting 100's-200  Pre-lunch 160's  Pre-dinner 200's  Bedtime mid 100's    Current diet: on average, 2-3 meals per day. Snacks on fruit/nut/cheese. Drinks mostly water. Drinks some low sugar sodas.   Current exercise: no regular exercise. Is a nurse.   Any episodes  of hypoglycemia? no  Last eye exam: < 1 year ago and had retinopathy  Last foot exam: 3 months ago and nl per pt    On Norvasc, ace inh for HTN.           Diabetes Management Status    Statin: Not taking  ACE/ARB: Taking    Screening or Prevention Patient's value Goal Complete/Controlled?   HgA1C Testing and Control   Lab Results   Component Value Date    HGBA1C 8.1 (H) 03/23/2022      Annually/Less than 8% No   Lipid profile : 05/28/2020 Annually No   LDL control No results found for: LDLCALC Annually/Less than 100 mg/dl  No   Nephropathy screening Lab Results   Component Value Date    LABMICR <5.0 03/29/2022     No results found for: PROTEINUA Annually No   Blood pressure BP Readings from Last 1 Encounters:   03/29/22 126/72    Less than 140/90 Yes   Dilated retinal exam Most Recent Eye Exam Date: Not Found Annually Yes   Foot exam   Most Recent Foot Exam Date: Not Found Annually No     Review of Systems     Past Medical History:   Diagnosis Date    Abnormal Pap smear of cervix     Depression     Diabetes mellitus type I     OLEGARIO    Diabetes mellitus, type 2     OLEGARIO    Hyperlipidemia     Hypertension     Obesity       Social History     Tobacco Use    Smoking status: Former Smoker     Types: Cigarettes    Smokeless tobacco: Never Used   Substance Use Topics    Alcohol use: Yes    Drug use: Never     Family History   Problem Relation Age of Onset    Anemia Mother     Diabetes Mother     Diabetes Father     Breast cancer Paternal Grandmother     Colon cancer Maternal Grandfather     Ovarian cancer Neg Hx       Past Surgical History:   Procedure Laterality Date    CERVICAL BIOPSY  W/ LOOP ELECTRODE EXCISION            Current Outpatient Medications:     amLODIPine (NORVASC) 5 MG tablet, Take 1 tablet (5 mg total) by mouth once daily., Disp: 30 tablet, Rfl: 0    buPROPion (WELLBUTRIN XL) 150 MG TB24 tablet, Take 300 mg by mouth once daily., Disp: , Rfl:     fluconazole (DIFLUCAN) 150 MG Tab, Take  "1 tablet by mouth daily as needed., Disp: , Rfl:     lisinopriL (PRINIVIL,ZESTRIL) 20 MG tablet, Take 1 tablet (20 mg total) by mouth once daily., Disp: 30 tablet, Rfl: 0    lisinopriL-hydrochlorothiazide (PRINZIDE,ZESTORETIC) 20-25 mg Tab, Take 1 tablet by mouth once daily., Disp: , Rfl:     metFORMIN (GLUCOPHAGE) 500 MG tablet, Take 1,000 mg by mouth 2 (two) times daily., Disp: , Rfl:     metoprolol succinate (TOPROL-XL) 25 MG 24 hr tablet, Take 25 mg by mouth once daily., Disp: , Rfl:     NOVOLOG U-100 INSULIN ASPART 100 unit/mL injection, Use with insulin pump. TDD insulin not to exceed 90 units., Disp: 50 mL, Rfl: 5     Review of patient's allergies indicates:   Allergen Reactions    Penicillins         Objective:   /72   Pulse 92   Ht 5' 6" (1.676 m)   Wt 92.1 kg (203 lb 0.7 oz)   LMP 03/16/2022 (Exact Date)   SpO2 99%   BMI 32.77 kg/m²   BP Readings from Last 3 Encounters:   03/29/22 126/72   03/23/22 136/80   08/25/21 (!) 156/102     Wt Readings from Last 3 Encounters:   03/29/22 1338 92.1 kg (203 lb 0.7 oz)   03/23/22 1354 93 kg (205 lb 0.4 oz)   08/25/21 0511 90.7 kg (200 lb)          Physical Exam  Vitals reviewed.   Constitutional:       General: She is not in acute distress.     Appearance: Normal appearance. She is well-developed. She is not ill-appearing, toxic-appearing or diaphoretic.   HENT:      Head: Normocephalic and atraumatic.   Eyes:      General: No scleral icterus.  Neck:      Trachea: No tracheal deviation.   Cardiovascular:      Rate and Rhythm: Normal rate and regular rhythm.      Heart sounds: No murmur heard.  Pulmonary:      Effort: Pulmonary effort is normal. No respiratory distress.   Abdominal:      Comments: Obese, No lipohypertrophy     Neurological:      Mental Status: She is alert and oriented to person, place, and time.   Psychiatric:         Thought Content: Thought content normal.           Lab Results   Component Value Date    HGBA1C 8.1 (H) 03/23/2022 "       Lab Results   Component Value Date    TSH 1.414 03/23/2022        Thyroid Labs Latest Ref Rng & Units 3/23/2022   TSH 0.400 - 4.000 uIU/mL 1.414           Hemoglobin A1C   Date Value Ref Range Status   03/23/2022 8.1 (H) 4.0 - 5.6 % Final     Comment:     ADA Screening Guidelines:  5.7-6.4%  Consistent with prediabetes  >or=6.5%  Consistent with diabetes    High levels of fetal hemoglobin interfere with the HbA1C  assay. Heterozygous hemoglobin variants (HbS, HgC, etc)do  not significantly interfere with this assay.   However, presence of multiple variants may affect accuracy.     05/28/2020 7.4 (H) <=6.5 % Final         Assessment and plan:     Problem List Items Addressed This Visit        Cardiac/Vascular    Hypertension, essential (Chronic)     BP controlled. Continue current meds. Pt to notify PCP if BP consistently more than 140/90.                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 Notes last c peptide was very low.   Metformin on med list but we did not address if pt was taking.  BG not at goal.  HbA1C 8.1.  Recently pump running on current settings without out auto correction from sensor. BG has been higher due to this.   Needs more basal and bolus insulin with meals. Also note some variance in estimating carbs that could account for some post prandial hyperglycemia. Advised pt to apps to help her estimate carbs.  Change ICR from 1:10 to 1:9.   Change BG target from 155 to 145. Pt notes this was set high because she sometimes does not eat during a busy shift.   Continue ISF 30  Change 0700 basal rate from 1.5 to 1.6.  Continue other basal rates: 00:00 1.25, 0300 1.3, 21:00 1.9.  Pt using pump and sensor. Is checking BG 4x daily and requires MDI of insulin via insulin pump.   Up to date with eye exam   Urine microalbumin wnls. On ace.  Discussed proper foot care.  Counseled pt on low carb/low fat diet and to increase  physical activity.  On statin               Relevant Medications    NOVOLOG U-100 INSULIN ASPART 100 unit/mL injection    Other Relevant Orders    Microalbumin/Creatinine Ratio, Urine (Completed)    Class 1 obesity due to excess calories with serious comorbidity and body mass index (BMI) of 32.0 to 32.9 in adult     BMI  32. Encouraged healthy low fat low carb diet and increase physical activity.                  Send info to [employer] for refill for sensors and pump supplies    RTC in  1 month      Disclaimer: This note has been generated using voice-recognition software. There may be typographical errors that have been missed during proof-reading.

## 2022-03-29 NOTE — ASSESSMENT & PLAN NOTE
Pt with OLEGARIO tx'ed as type 1. Notes last c peptide was very low.   Metformin on med list but we did not address if pt was taking.  BG not at goal.  HbA1C 8.1.  Recently pump running on current settings without out auto correction from sensor. BG has been higher due to this.   Needs more basal and bolus insulin with meals. Also note some variance in estimating carbs that could account for some post prandial hyperglycemia. Advised pt to apps to help her estimate carbs.  Change ICR from 1:10 to 1:9.   Change BG target from 155 to 145. Pt notes this was set high because she sometimes does not eat during a busy shift.   Continue ISF 30  Change 0700 basal rate from 1.5 to 1.6.  Continue other basal rates: 00:00 1.25, 0300 1.3, 21:00 1.9.  Pt using pump and sensor. Is checking BG 4x daily and requires MDI of insulin via insulin pump.   Up to date with eye exam   Urine microalbumin wnls. On ace.  Discussed proper foot care.  Counseled pt on low carb/low fat diet and to increase physical activity.  On statin

## 2022-03-29 NOTE — PATIENT INSTRUCTIONS
Changed insulin to carb ratio from 1:10 to 1:9.    Changed basal insulin settings at 0700 from 1.5 to 1.6 units.     Changed BG target from  to upper threshold of 145.

## 2022-03-30 ENCOUNTER — PATIENT MESSAGE (OUTPATIENT)
Dept: ENDOCRINOLOGY | Facility: CLINIC | Age: 37
End: 2022-03-30
Payer: COMMERCIAL

## 2022-03-30 PROBLEM — E66.09 CLASS 1 OBESITY DUE TO EXCESS CALORIES WITH SERIOUS COMORBIDITY AND BODY MASS INDEX (BMI) OF 32.0 TO 32.9 IN ADULT: Status: ACTIVE | Noted: 2022-03-30

## 2022-03-30 LAB
ALBUMIN/CREAT UR: NORMAL UG/MG (ref 0–30)
CREAT UR-MCNC: 77 MG/DL (ref 15–325)
MICROALBUMIN UR DL<=1MG/L-MCNC: <5 UG/ML

## 2022-03-30 RX ORDER — INSULIN ASPART 100 [IU]/ML
INJECTION, SOLUTION INTRAVENOUS; SUBCUTANEOUS
Qty: 50 ML | Refills: 5 | Status: SHIPPED | OUTPATIENT
Start: 2022-03-30 | End: 2022-04-04

## 2022-04-01 LAB
FINAL PATHOLOGIC DIAGNOSIS: NORMAL
Lab: NORMAL

## 2022-04-01 RX ORDER — AMLODIPINE BESYLATE 5 MG/1
TABLET ORAL
Qty: 30 TABLET | Refills: 0 | Status: SHIPPED | OUTPATIENT
Start: 2022-04-01 | End: 2022-04-02

## 2022-04-02 DIAGNOSIS — I10 HYPERTENSION, ESSENTIAL: ICD-10-CM

## 2022-04-03 RX ORDER — AMLODIPINE BESYLATE 5 MG/1
TABLET ORAL
Qty: 90 TABLET | Refills: 0 | Status: SHIPPED | OUTPATIENT
Start: 2022-04-03

## 2022-04-04 RX ORDER — INSULIN LISPRO 100 [IU]/ML
INJECTION, SOLUTION INTRAVENOUS; SUBCUTANEOUS
Qty: 40 ML | Refills: 5 | Status: SHIPPED | OUTPATIENT
Start: 2022-04-04

## 2022-04-06 ENCOUNTER — OFFICE VISIT (OUTPATIENT)
Dept: FAMILY MEDICINE | Facility: CLINIC | Age: 37
End: 2022-04-06
Payer: COMMERCIAL

## 2022-04-06 DIAGNOSIS — J06.9 VIRAL URI: Primary | ICD-10-CM

## 2022-04-06 PROCEDURE — 3052F HG A1C>EQUAL 8.0%<EQUAL 9.0%: CPT | Mod: CPTII,95,, | Performed by: PHYSICIAN ASSISTANT

## 2022-04-06 PROCEDURE — 3061F NEG MICROALBUMINURIA REV: CPT | Mod: CPTII,95,, | Performed by: PHYSICIAN ASSISTANT

## 2022-04-06 PROCEDURE — 4010F PR ACE/ARB THEARPY RXD/TAKEN: ICD-10-PCS | Mod: CPTII,95,, | Performed by: PHYSICIAN ASSISTANT

## 2022-04-06 PROCEDURE — 3066F NEPHROPATHY DOC TX: CPT | Mod: CPTII,95,, | Performed by: PHYSICIAN ASSISTANT

## 2022-04-06 PROCEDURE — 4010F ACE/ARB THERAPY RXD/TAKEN: CPT | Mod: CPTII,95,, | Performed by: PHYSICIAN ASSISTANT

## 2022-04-06 PROCEDURE — 3061F PR NEG MICROALBUMINURIA RESULT DOCUMENTED/REVIEW: ICD-10-PCS | Mod: CPTII,95,, | Performed by: PHYSICIAN ASSISTANT

## 2022-04-06 PROCEDURE — 3052F PR MOST RECENT HEMOGLOBIN A1C LEVEL 8.0 - < 9.0%: ICD-10-PCS | Mod: CPTII,95,, | Performed by: PHYSICIAN ASSISTANT

## 2022-04-06 PROCEDURE — 99213 OFFICE O/P EST LOW 20 MIN: CPT | Mod: 95,,, | Performed by: PHYSICIAN ASSISTANT

## 2022-04-06 PROCEDURE — 3066F PR DOCUMENTATION OF TREATMENT FOR NEPHROPATHY: ICD-10-PCS | Mod: CPTII,95,, | Performed by: PHYSICIAN ASSISTANT

## 2022-04-06 PROCEDURE — 99213 PR OFFICE/OUTPT VISIT, EST, LEVL III, 20-29 MIN: ICD-10-PCS | Mod: 95,,, | Performed by: PHYSICIAN ASSISTANT

## 2022-04-06 NOTE — LETTER
April 6, 2022      [facility]  [address] [place] [address]  Phone: [phone]  Fax: [phone]       Patient: Ssuan Delaney   YOB: 1985  Date of Visit: 04/06/2022    To Whom It May Concern:    Elise Delaney  was seen with [facility] on 04/06/2022. The patient may return to work on 04/07/2022 with no restrictions. If you have any questions or concerns, or if I can be of further assistance, please do not hesitate to contact me.    Sincerely,    Karen Donald PA-C

## 2022-04-06 NOTE — PROGRESS NOTES
Subjective:       Patient ID: Susan Delaney is a 36 y.o. female     The patient location is: [place]  The chief complaint leading to consultation is:  Sore throat and ear pain    Visit type: audiovisual    Face to Face time with patient:  20 minutes  Twenty minutes of total time spent on the encounter, which includes face to face time and non-face to face time preparing to see the patient (eg, review of tests), Obtaining and/or reviewing separately obtained history, Documenting clinical information in the electronic or other health record, Independently interpreting results (not separately reported) and communicating results to the patient/family/caregiver, or Care coordination (not separately reported).         Each patient to whom he or she provides medical services by telemedicine is:  (1) informed of the relationship between the physician and patient and the respective role of any other health care provider with respect to management of the patient; and (2) notified that he or she may decline to receive medical services by telemedicine and may withdraw from such care at any time.    Notes:         Chief Complaint: No chief complaint on file.    HPI  Patient's  PCP: Primary Doctor No.  The patient is new to me  Patient's past medical history includes:  Type 2 DM, depression, hypertension, hyperlipidemai    She is [place] a ear ache, sore throat, PND, intermittent cough that began 2 days ago.  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 She took off work today because this morning she felt like her symptoms were worse.     Review of Systems   HENT: Positive for ear discharge, ear pain, rhinorrhea and sore throat. Negative for hearing loss.    Respiratory: Negative for cough.    Gastrointestinal: Negative for abdominal pain, diarrhea and vomiting.   Musculoskeletal: Positive for neck pain.   Skin: Negative for rash.   Neurological: Positive for headaches.        Objective:   Physical Exam  Constitutional:       General: She is  not in acute distress.     Appearance: She is well-developed.   HENT:      Head: Normocephalic and atraumatic.      Mouth/Throat:      Comments: Hoarse voice  Neck:      Thyroid: No thyromegaly.   Pulmonary:      Effort: Pulmonary effort is normal.   Musculoskeletal:         General: Normal range of motion.      Cervical back: Normal range of motion and neck supple.   Skin:     Findings: No erythema or rash.   Neurological:      Mental Status: She is alert and oriented to person, place, and time.   Psychiatric:         Behavior: Behavior normal.         Thought Content: Thought content normal.         Judgment: Judgment normal.          Assessment:       1. Viral URI  Influenza A & B by Molecular    COVID-19 Routine Screening        Plan:       Viral URI  -     Influenza A & B by Molecular  -     COVID-19 Routine Screening; Future; Expected date: 04/06/2022  - Most UPPER RESPIRATORY INFECTIONS are caused by viruses.    Antibiotics will not make the infection clear more quickly.  Using antibiotics when they are not needed can cause germs that cause infections to become resistant, making them more difficult to cure.  Therefore, no antibiotics are prescribed today.      Increase your water intake to 64-80 oz daily, unless otherwise restricted.    Nasal Saline spray (Over the counter [place] spray or [place])  2 sprays each nostril 2-3 times a day for nasal congestion    Tylenol 500 mg 2 tablets or Ibuprofen 200 mg 2 tablets every 4-6 hours as needed (unless a doctor has told you not to take Tyenol or ibuprofen) for fever, headaches, sore throat, ear pain, bodyaches, and/or nasal/sinus inflammation    Warm salt water gargles with 1/2 cup water and 1 tablespoon salt every 4 hours    Call here or follow up with your primary care provider in 1 week if symptoms do not resolve                Follow up if symptoms worsen or fail to improve.       Karen Donald PA-C   04/06/2022   9:30 AM

## 2022-04-11 ENCOUNTER — OFFICE VISIT (OUTPATIENT)
Dept: FAMILY MEDICINE | Facility: CLINIC | Age: 37
End: 2022-04-11
Payer: COMMERCIAL

## 2022-04-11 ENCOUNTER — TELEPHONE (OUTPATIENT)
Dept: HOME HEALTH SERVICES | Facility: HOSPITAL | Age: 37
End: 2022-04-11
Payer: COMMERCIAL

## 2022-04-11 VITALS — SYSTOLIC BLOOD PRESSURE: 120 MMHG | DIASTOLIC BLOOD PRESSURE: 74 MMHG | HEART RATE: 81 BPM | OXYGEN SATURATION: 100 %

## 2022-04-11 DIAGNOSIS — F32.A DEPRESSION, UNSPECIFIED DEPRESSION TYPE: ICD-10-CM

## 2022-04-11 DIAGNOSIS — E13.9 LADA (LATENT AUTOIMMUNE DIABETES IN ADULTS), MANAGED AS TYPE 1: Primary | ICD-10-CM

## 2022-04-11 DIAGNOSIS — I10 HYPERTENSION, ESSENTIAL: ICD-10-CM

## 2022-04-11 PROCEDURE — 3066F NEPHROPATHY DOC TX: CPT | Mod: CPTII,S$GLB,, | Performed by: PHYSICIAN ASSISTANT

## 2022-04-11 PROCEDURE — 4010F ACE/ARB THERAPY RXD/TAKEN: CPT | Mod: CPTII,S$GLB,, | Performed by: PHYSICIAN ASSISTANT

## 2022-04-11 PROCEDURE — 3074F PR MOST RECENT SYSTOLIC BLOOD PRESSURE < 130 MM HG: ICD-10-PCS | Mod: CPTII,S$GLB,, | Performed by: PHYSICIAN ASSISTANT

## 2022-04-11 PROCEDURE — 1160F PR REVIEW ALL MEDS BY PRESCRIBER/CLIN PHARMACIST DOCUMENTED: ICD-10-PCS | Mod: CPTII,S$GLB,, | Performed by: PHYSICIAN ASSISTANT

## 2022-04-11 PROCEDURE — 3052F HG A1C>EQUAL 8.0%<EQUAL 9.0%: CPT | Mod: CPTII,S$GLB,, | Performed by: PHYSICIAN ASSISTANT

## 2022-04-11 PROCEDURE — 3078F PR MOST RECENT DIASTOLIC BLOOD PRESSURE < 80 MM HG: ICD-10-PCS | Mod: CPTII,S$GLB,, | Performed by: PHYSICIAN ASSISTANT

## 2022-04-11 PROCEDURE — 99999 PR PBB SHADOW E&M-EST. PATIENT-LVL III: ICD-10-PCS | Mod: PBBFAC,,, | Performed by: PHYSICIAN ASSISTANT

## 2022-04-11 PROCEDURE — 1159F MED LIST DOCD IN RCRD: CPT | Mod: CPTII,S$GLB,, | Performed by: PHYSICIAN ASSISTANT

## 2022-04-11 PROCEDURE — 3078F DIAST BP <80 MM HG: CPT | Mod: CPTII,S$GLB,, | Performed by: PHYSICIAN ASSISTANT

## 2022-04-11 PROCEDURE — 4010F PR ACE/ARB THEARPY RXD/TAKEN: ICD-10-PCS | Mod: CPTII,S$GLB,, | Performed by: PHYSICIAN ASSISTANT

## 2022-04-11 PROCEDURE — 3061F NEG MICROALBUMINURIA REV: CPT | Mod: CPTII,S$GLB,, | Performed by: PHYSICIAN ASSISTANT

## 2022-04-11 PROCEDURE — 1159F PR MEDICATION LIST DOCUMENTED IN MEDICAL RECORD: ICD-10-PCS | Mod: CPTII,S$GLB,, | Performed by: PHYSICIAN ASSISTANT

## 2022-04-11 PROCEDURE — 3061F PR NEG MICROALBUMINURIA RESULT DOCUMENTED/REVIEW: ICD-10-PCS | Mod: CPTII,S$GLB,, | Performed by: PHYSICIAN ASSISTANT

## 2022-04-11 PROCEDURE — 1160F RVW MEDS BY RX/DR IN RCRD: CPT | Mod: CPTII,S$GLB,, | Performed by: PHYSICIAN ASSISTANT

## 2022-04-11 PROCEDURE — 99214 OFFICE O/P EST MOD 30 MIN: CPT | Mod: S$GLB,,, | Performed by: PHYSICIAN ASSISTANT

## 2022-04-11 PROCEDURE — 3052F PR MOST RECENT HEMOGLOBIN A1C LEVEL 8.0 - < 9.0%: ICD-10-PCS | Mod: CPTII,S$GLB,, | Performed by: PHYSICIAN ASSISTANT

## 2022-04-11 PROCEDURE — 3074F SYST BP LT 130 MM HG: CPT | Mod: CPTII,S$GLB,, | Performed by: PHYSICIAN ASSISTANT

## 2022-04-11 PROCEDURE — 99214 PR OFFICE/OUTPT VISIT, EST, LEVL IV, 30-39 MIN: ICD-10-PCS | Mod: S$GLB,,, | Performed by: PHYSICIAN ASSISTANT

## 2022-04-11 PROCEDURE — 99999 PR PBB SHADOW E&M-EST. PATIENT-LVL III: CPT | Mod: PBBFAC,,, | Performed by: PHYSICIAN ASSISTANT

## 2022-04-11 PROCEDURE — 3066F PR DOCUMENTATION OF TREATMENT FOR NEPHROPATHY: ICD-10-PCS | Mod: CPTII,S$GLB,, | Performed by: PHYSICIAN ASSISTANT

## 2022-04-11 RX ORDER — CLONAZEPAM 1 MG/1
1 TABLET ORAL DAILY
COMMUNITY
Start: 2022-01-05

## 2022-04-11 RX ORDER — KETOROLAC TROMETHAMINE 10 MG/1
10 TABLET, FILM COATED ORAL EVERY 6 HOURS PRN
COMMUNITY
Start: 2022-03-18

## 2022-04-11 RX ORDER — FLUCONAZOLE 150 MG/1
150 TABLET ORAL ONCE AS NEEDED
Qty: 10 TABLET | Refills: 0 | Status: SHIPPED | OUTPATIENT
Start: 2022-04-11 | End: 2022-04-11

## 2022-04-11 RX ORDER — BUPROPION HYDROCHLORIDE 150 MG/1
300 TABLET ORAL DAILY
Qty: 60 TABLET | Refills: 3 | Status: SHIPPED | OUTPATIENT
Start: 2022-04-11 | End: 2022-05-11

## 2022-04-11 NOTE — PROGRESS NOTES
Subjective:       Patient ID: Susan Delaney is a 36 y.o. female.    Chief Complaint: Follow-up    HPI     Pt is known to me, needs to establish with PCP.     Pt is a 36 year old female with depression, HTN, HLD, T1DM. She presents today for follow up regarding HTN. I first saw her virtually about 1 month ago and placed her on amlodipine 5 daily in addition to her HCTZ+ lisinopril and metoprolol. Doing well on this. No side effects. BP running completely WNL at home. 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     Review of Systems   Constitutional: Negative for chills, fatigue and fever.   HENT: Negative for postnasal drip, sinus pressure, sinus pain, sore throat and trouble swallowing.    Respiratory: Negative for cough, chest tightness, shortness of breath and wheezing.    Cardiovascular: Negative for chest pain and palpitations.   Gastrointestinal: Negative for abdominal pain, constipation, diarrhea, nausea and vomiting.   Genitourinary: Negative for dysuria, flank pain and urgency.   Musculoskeletal: Positive for neck pain. Negative for back pain and myalgias.   Neurological: Positive for headaches. Negative for dizziness, seizures, syncope, weakness and light-headedness.   Psychiatric/Behavioral: Negative for sleep disturbance. The patient is not nervous/anxious.          Objective:      Physical Exam   Constitutional: She is oriented to person, place, and time.  Non-toxic appearance. She does not appear ill. No distress.   HENT:   Head: Normocephalic and atraumatic.   Right Ear: Tympanic membrane, external ear and ear canal normal.   Left Ear: Tympanic membrane, external ear and ear canal normal.   Nose: Nose normal. No rhinorrhea or congestion.   Mouth/Throat: Mucous membranes are moist. No oropharyngeal exudate or posterior oropharyngeal erythema. Oropharynx is clear.   Eyes: Pupils are equal, round, and reactive to light. Conjunctivae are normal. Right eye  exhibits no discharge. Left eye exhibits no discharge. No scleral icterus.   Neck: No thyroid mass and no thyromegaly present.   Cardiovascular: Normal rate, regular rhythm, normal heart sounds and normal pulses.   Pulmonary/Chest: Effort normal and breath sounds normal.   Abdominal: Soft. Normal appearance and bowel sounds are normal.   Musculoskeletal:         General: Normal range of motion.   Neurological: She is alert and oriented to person, place, and time.   Skin: Skin is warm. Capillary refill takes less than 2 seconds. She is not diaphoretic.   Psychiatric: Her behavior is normal. Mood, judgment and thought content normal.   Vitals reviewed.        Assessment:       1. 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 Depression, unspecified depression type    3. Hypertension, essential        Plan:         1. 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  Dispense: 10 tablet; Refill: 0    2. Depression, unspecified depression type  -refill given  - buPROPion (WELLBUTRIN XL) 150 MG TB24 tablet; Take 2 tablets (300 mg total) by mouth once daily.  Dispense: 60 tablet; Refill: 3    3. Hypertension, essential  -continue current regimen, start workup for secondary causes as on 4 anti-hypertensive agents  - Aldosterone/Renin Activity Ratio; Future  - ALDOSTERONE; Future  - RENIN; Future    RTC/Er precautions given. F/U pending results or prn

## 2022-04-11 NOTE — PATIENT INSTRUCTIONS
A few reminders from today:    Reminder to get eye exam      Follow up with me if needed.   Please go to ER/urgent care if after hours or symptoms persist/worsen.     Do not hesitate to get in touch with me should you have any further questions.     Thank you for trusting me with your care!  I wish you health and happiness.    -Dara Enriquez PA-C

## 2022-04-13 ENCOUNTER — PATIENT MESSAGE (OUTPATIENT)
Dept: FAMILY MEDICINE | Facility: CLINIC | Age: 37
End: 2022-04-13
Payer: COMMERCIAL

## 2022-04-18 ENCOUNTER — HOSPITAL ENCOUNTER (OUTPATIENT)
Dept: RADIOLOGY | Facility: HOSPITAL | Age: 37
Discharge: HOME | End: 2022-04-18
Attending: STUDENT IN AN ORGANIZED HEALTH CARE EDUCATION/TRAINING PROGRAM
Payer: COMMERCIAL

## 2022-04-18 DIAGNOSIS — N93.9 ABNORMAL UTERINE BLEEDING (AUB): ICD-10-CM

## 2022-04-18 PROCEDURE — 76830 US PELVIS COMP WITH TRANSVAG NON-OB (XPD): ICD-10-PCS | Mod: 26,,, | Performed by: RADIOLOGY

## 2022-04-18 PROCEDURE — 76830 TRANSVAGINAL US NON-OB: CPT | Mod: TC,PO

## 2022-04-18 PROCEDURE — 76856 US PELVIS COMP WITH TRANSVAG NON-OB (XPD): ICD-10-PCS | Mod: 26,,, | Performed by: RADIOLOGY

## 2022-04-18 PROCEDURE — 76830 TRANSVAGINAL US NON-OB: CPT | Mod: 26,,, | Performed by: RADIOLOGY

## 2022-04-18 PROCEDURE — 76856 US EXAM PELVIC COMPLETE: CPT | Mod: 26,,, | Performed by: RADIOLOGY

## 2022-04-19 ENCOUNTER — LAB VISIT (OUTPATIENT)
Dept: LAB | Facility: HOSPITAL | Age: 37
End: 2022-04-19
Attending: PHYSICIAN ASSISTANT
Payer: COMMERCIAL

## 2022-04-19 DIAGNOSIS — I10 HYPERTENSION, ESSENTIAL: ICD-10-CM

## 2022-04-19 PROCEDURE — 82088 ASSAY OF ALDOSTERONE: CPT | Mod: 91 | Performed by: PHYSICIAN ASSISTANT

## 2022-04-19 PROCEDURE — 84244 ASSAY OF RENIN: CPT | Mod: 91 | Performed by: PHYSICIAN ASSISTANT

## 2022-04-19 PROCEDURE — 36415 COLL VENOUS BLD VENIPUNCTURE: CPT | Mod: PO | Performed by: PHYSICIAN ASSISTANT

## 2022-04-19 PROCEDURE — 84244 ASSAY OF RENIN: CPT | Performed by: PHYSICIAN ASSISTANT

## 2022-04-22 LAB
ALDOST SERPL-MCNC: 7.7 NG/DL
RENIN PLAS-CCNC: <0.6 NG/ML/H

## 2022-04-24 LAB
ALDOST SERPL-MCNC: 5.6 NG/DL
ALDOST/RENIN PLAS-RTO: 18.7 RATIO
RENIN PLAS-CCNC: 0.3 NG/ML/HR

## 2022-04-25 DIAGNOSIS — I1A.0 RESISTANT HYPERTENSION: Primary | ICD-10-CM

## 2022-04-27 ENCOUNTER — TELEPHONE (OUTPATIENT)
Dept: FAMILY MEDICINE | Facility: CLINIC | Age: 37
End: 2022-04-27
Payer: COMMERCIAL

## 2022-04-27 NOTE — TELEPHONE ENCOUNTER
Left message for pt to call back regarding blood work and plan of care    ----- Message from Dara Enriquez PA-C sent at 4/25/2022  8:11 AM CDT -----  Labs look good. Next step in evaluating if there is a reason for such high BP in to get a kidney u/s. Please schedule

## 2022-04-27 NOTE — TELEPHONE ENCOUNTER
----- Message from Gay Song sent at 4/27/2022 12:36 PM CDT -----  Contact: pt  Type: Needs Medical Advice         Who Called: pt   Best Call Back Number:[phone]  Additional Information: Requesting a call back regarding  pt would like office to return her call. Pt returned missed call from office  Please Advise- Thank you